# Patient Record
Sex: MALE | Race: WHITE | Employment: OTHER | URBAN - METROPOLITAN AREA
[De-identification: names, ages, dates, MRNs, and addresses within clinical notes are randomized per-mention and may not be internally consistent; named-entity substitution may affect disease eponyms.]

---

## 2023-01-01 ENCOUNTER — HOSPITAL ENCOUNTER (INPATIENT)
Age: 88
LOS: 1 days | Discharge: HOME | DRG: 057 | End: 2023-01-02
Attending: STUDENT IN AN ORGANIZED HEALTH CARE EDUCATION/TRAINING PROGRAM | Admitting: FAMILY MEDICINE
Payer: MEDICARE

## 2023-01-01 ENCOUNTER — APPOINTMENT (OUTPATIENT)
Dept: MRI IMAGING | Age: 88
DRG: 057 | End: 2023-01-01
Attending: STUDENT IN AN ORGANIZED HEALTH CARE EDUCATION/TRAINING PROGRAM
Payer: MEDICARE

## 2023-01-01 ENCOUNTER — APPOINTMENT (OUTPATIENT)
Dept: CT IMAGING | Age: 88
DRG: 057 | End: 2023-01-01
Attending: STUDENT IN AN ORGANIZED HEALTH CARE EDUCATION/TRAINING PROGRAM
Payer: MEDICARE

## 2023-01-01 DIAGNOSIS — I10 HYPERTENSION, UNSPECIFIED TYPE: ICD-10-CM

## 2023-01-01 DIAGNOSIS — G30.9 MILD ALZHEIMER'S DEMENTIA WITH ANXIETY, UNSPECIFIED TIMING OF DEMENTIA ONSET (HCC): ICD-10-CM

## 2023-01-01 DIAGNOSIS — F02.A4 MILD ALZHEIMER'S DEMENTIA WITH ANXIETY, UNSPECIFIED TIMING OF DEMENTIA ONSET (HCC): ICD-10-CM

## 2023-01-01 DIAGNOSIS — R47.01 APHASIA: Primary | ICD-10-CM

## 2023-01-01 PROBLEM — G45.9 TIA (TRANSIENT ISCHEMIC ATTACK): Status: ACTIVE | Noted: 2023-01-01

## 2023-01-01 LAB
ALBUMIN SERPL-MCNC: 3.8 G/DL (ref 3.5–5)
ALBUMIN/GLOB SERPL: 1.4 {RATIO} (ref 1.1–2.2)
ALP SERPL-CCNC: 43 U/L (ref 45–117)
ALT SERPL-CCNC: 22 U/L (ref 12–78)
ANION GAP SERPL CALC-SCNC: 6 MMOL/L (ref 5–15)
AST SERPL-CCNC: 18 U/L (ref 15–37)
BASOPHILS # BLD: 0.1 K/UL (ref 0–0.1)
BASOPHILS NFR BLD: 2 % (ref 0–1)
BILIRUB SERPL-MCNC: 0.3 MG/DL (ref 0.2–1)
BUN SERPL-MCNC: 30 MG/DL (ref 6–20)
BUN/CREAT SERPL: 24 (ref 12–20)
CALCIUM SERPL-MCNC: 8.8 MG/DL (ref 8.5–10.1)
CHLORIDE SERPL-SCNC: 101 MMOL/L (ref 97–108)
CO2 SERPL-SCNC: 29 MMOL/L (ref 21–32)
COMMENT, HOLDF: NORMAL
CREAT SERPL-MCNC: 1.24 MG/DL (ref 0.7–1.3)
DIFFERENTIAL METHOD BLD: ABNORMAL
EOSINOPHIL # BLD: 0.5 K/UL (ref 0–0.4)
EOSINOPHIL NFR BLD: 8 % (ref 0–7)
ERYTHROCYTE [DISTWIDTH] IN BLOOD BY AUTOMATED COUNT: 11.8 % (ref 11.5–14.5)
GLOBULIN SER CALC-MCNC: 2.7 G/DL (ref 2–4)
GLUCOSE BLD STRIP.AUTO-MCNC: 107 MG/DL (ref 65–117)
GLUCOSE SERPL-MCNC: 86 MG/DL (ref 65–100)
HCT VFR BLD AUTO: 33.2 % (ref 36.6–50.3)
HGB BLD-MCNC: 10.8 G/DL (ref 12.1–17)
IMM GRANULOCYTES # BLD AUTO: 0 K/UL (ref 0–0.04)
IMM GRANULOCYTES NFR BLD AUTO: 0 % (ref 0–0.5)
INR PPP: 1 (ref 0.9–1.1)
LYMPHOCYTES # BLD: 1.1 K/UL (ref 0.8–3.5)
LYMPHOCYTES NFR BLD: 16 % (ref 12–49)
MCH RBC QN AUTO: 32.4 PG (ref 26–34)
MCHC RBC AUTO-ENTMCNC: 32.5 G/DL (ref 30–36.5)
MCV RBC AUTO: 99.7 FL (ref 80–99)
MONOCYTES # BLD: 1 K/UL (ref 0–1)
MONOCYTES NFR BLD: 14 % (ref 5–13)
NEUTS SEG # BLD: 4.3 K/UL (ref 1.8–8)
NEUTS SEG NFR BLD: 60 % (ref 32–75)
NRBC # BLD: 0 K/UL (ref 0–0.01)
NRBC BLD-RTO: 0 PER 100 WBC
PLATELET # BLD AUTO: 205 K/UL (ref 150–400)
PMV BLD AUTO: 8.8 FL (ref 8.9–12.9)
POTASSIUM SERPL-SCNC: 4.6 MMOL/L (ref 3.5–5.1)
PROT SERPL-MCNC: 6.5 G/DL (ref 6.4–8.2)
PROTHROMBIN TIME: 10.6 SEC (ref 9–11.1)
RBC # BLD AUTO: 3.33 M/UL (ref 4.1–5.7)
SAMPLES BEING HELD,HOLD: NORMAL
SERVICE CMNT-IMP: NORMAL
SODIUM SERPL-SCNC: 136 MMOL/L (ref 136–145)
WBC # BLD AUTO: 7 K/UL (ref 4.1–11.1)

## 2023-01-01 PROCEDURE — 80053 COMPREHEN METABOLIC PANEL: CPT

## 2023-01-01 PROCEDURE — 0042T CT CODE NEURO PERF W CBF: CPT

## 2023-01-01 PROCEDURE — 70496 CT ANGIOGRAPHY HEAD: CPT

## 2023-01-01 PROCEDURE — 65270000046 HC RM TELEMETRY

## 2023-01-01 PROCEDURE — 36415 COLL VENOUS BLD VENIPUNCTURE: CPT

## 2023-01-01 PROCEDURE — 85610 PROTHROMBIN TIME: CPT

## 2023-01-01 PROCEDURE — 85025 COMPLETE CBC W/AUTO DIFF WBC: CPT

## 2023-01-01 PROCEDURE — 70450 CT HEAD/BRAIN W/O DYE: CPT

## 2023-01-01 PROCEDURE — 99285 EMERGENCY DEPT VISIT HI MDM: CPT

## 2023-01-01 PROCEDURE — 74011000636 HC RX REV CODE- 636: Performed by: RADIOLOGY

## 2023-01-01 PROCEDURE — 82962 GLUCOSE BLOOD TEST: CPT

## 2023-01-01 RX ORDER — GUAIFENESIN 100 MG/5ML
324 LIQUID (ML) ORAL ONCE
Status: ACTIVE | OUTPATIENT
Start: 2023-01-01 | End: 2023-01-02

## 2023-01-01 RX ORDER — ACETAMINOPHEN 650 MG/1
650 SUPPOSITORY RECTAL
Status: DISCONTINUED | OUTPATIENT
Start: 2023-01-01 | End: 2023-01-02 | Stop reason: HOSPADM

## 2023-01-01 RX ORDER — ATORVASTATIN CALCIUM 40 MG/1
40 TABLET, FILM COATED ORAL
Status: DISCONTINUED | OUTPATIENT
Start: 2023-01-01 | End: 2023-01-02 | Stop reason: HOSPADM

## 2023-01-01 RX ORDER — GUAIFENESIN 100 MG/5ML
81 LIQUID (ML) ORAL DAILY
Status: DISCONTINUED | OUTPATIENT
Start: 2023-01-02 | End: 2023-01-01

## 2023-01-01 RX ORDER — ACETAMINOPHEN 325 MG/1
650 TABLET ORAL
Status: DISCONTINUED | OUTPATIENT
Start: 2023-01-01 | End: 2023-01-02 | Stop reason: HOSPADM

## 2023-01-01 RX ADMIN — IOPAMIDOL 80 ML: 755 INJECTION, SOLUTION INTRAVENOUS at 19:58

## 2023-01-01 RX ADMIN — IOPAMIDOL 40 ML: 755 INJECTION, SOLUTION INTRAVENOUS at 19:58

## 2023-01-02 ENCOUNTER — APPOINTMENT (OUTPATIENT)
Dept: MRI IMAGING | Age: 88
DRG: 057 | End: 2023-01-02
Attending: STUDENT IN AN ORGANIZED HEALTH CARE EDUCATION/TRAINING PROGRAM
Payer: MEDICARE

## 2023-01-02 VITALS
SYSTOLIC BLOOD PRESSURE: 97 MMHG | HEART RATE: 76 BPM | RESPIRATION RATE: 20 BRPM | WEIGHT: 203.48 LBS | TEMPERATURE: 98 F | OXYGEN SATURATION: 95 % | DIASTOLIC BLOOD PRESSURE: 61 MMHG

## 2023-01-02 LAB
CHOLEST SERPL-MCNC: 154 MG/DL
EST. AVERAGE GLUCOSE BLD GHB EST-MCNC: 111 MG/DL
HBA1C MFR BLD: 5.5 % (ref 4–5.6)
HDLC SERPL-MCNC: 71 MG/DL
HDLC SERPL: 2.2 {RATIO} (ref 0–5)
LDLC SERPL CALC-MCNC: 66 MG/DL (ref 0–100)
TRIGL SERPL-MCNC: 85 MG/DL (ref ?–150)
VLDLC SERPL CALC-MCNC: 17 MG/DL

## 2023-01-02 PROCEDURE — 97165 OT EVAL LOW COMPLEX 30 MIN: CPT

## 2023-01-02 PROCEDURE — 36415 COLL VENOUS BLD VENIPUNCTURE: CPT

## 2023-01-02 PROCEDURE — 83036 HEMOGLOBIN GLYCOSYLATED A1C: CPT

## 2023-01-02 PROCEDURE — 97535 SELF CARE MNGMENT TRAINING: CPT

## 2023-01-02 PROCEDURE — 97161 PT EVAL LOW COMPLEX 20 MIN: CPT

## 2023-01-02 PROCEDURE — 99223 1ST HOSP IP/OBS HIGH 75: CPT | Performed by: NURSE PRACTITIONER

## 2023-01-02 PROCEDURE — 70551 MRI BRAIN STEM W/O DYE: CPT

## 2023-01-02 PROCEDURE — 80061 LIPID PANEL: CPT

## 2023-01-02 RX ORDER — ATORVASTATIN CALCIUM 40 MG/1
40 TABLET, FILM COATED ORAL
Qty: 30 TABLET | Refills: 0 | Status: SHIPPED | OUTPATIENT
Start: 2023-01-02 | End: 2023-02-01

## 2023-01-02 RX ORDER — GUAIFENESIN 100 MG/5ML
81 LIQUID (ML) ORAL DAILY
Status: DISCONTINUED | OUTPATIENT
Start: 2023-01-02 | End: 2023-01-02 | Stop reason: HOSPADM

## 2023-01-02 RX ORDER — GUAIFENESIN 100 MG/5ML
81 LIQUID (ML) ORAL DAILY
Qty: 30 TABLET | Refills: 0 | Status: SHIPPED | OUTPATIENT
Start: 2023-01-02 | End: 2023-02-01

## 2023-01-02 NOTE — PROGRESS NOTES
[facility]  Hospitalist Group                                                                                          Hospitalist Progress Note  Ed Grayson  Answering service: [phone] from in house phone        Date of Service:  2023  NAME:  Holger Montano  :  1935  MRN:  [id number]      Admission Summary:   Holger Montano is a 82 y.o. male with a pmhx of alzheimers dementia, and HTN who presents with aphasia that started on 6pm.  He was eating dinner at the time. His sx rapidly resolved. Patient's son who gives the majority of the history due to patient's dementia, patient has recurrently stated that he can not speak or walk. Per paitent's son, this has happened again while patient was at his home. Patient was speaking, and had just ambulated to the bathroom without assistance    In the ED, he was tachycardic to 112, and had RR of 35. Initial BP was 181/90. Labs showed BUN 30, creatinine 1.24, and macrocytic anemia with hgb 10.8. CT head with no acute intracranial process. CTA head/neck with no acute large vessel occlusion or perfusion abnormality. There was small nonocclusive plaque/atheroembolus in M2/M3 of left MCA, and mild aneurysmal dilatations of ascending aorta. Interval history / Subjective:   No acute interval events. Saw the patient on rounds this morning, pt's son was at the bedside. Neuro exam was grossly benign. Son is hoping to have the pt return to [facility] today. Informed him that we would await recommendations from the neurology team before making a discharge decision. CM made aware in case we can discharge today     Assessment & Plan:     ?aphasia  Weakness  C/F CVA  - Neurology consulted, will follow up with their recommendations  - CT code stroke: No acute large vessel occlusion, no acute intracranial process  - MRI brain: Diffuse periventricular white matter disease is compatible with chronic small  vessel ischemia.  No evidence of acute infarct, intracranial mass, or acute  intracranial hemorrhage  - Echo pending  - Lipid panel: WNL, will continue statin pending neuro recs   - Continue ASA and statin  - PT/OT to evaluate    Alzhemiers dementia  - Lives at [facility]  - Will follow up with case management for disposition, hopefully can DC today (1/2)    Code status: Full  Prophylaxis: SCDs  Care Plan discussed with: Patient, family member, CM  Anticipated Disposition: Back to [facility], 24-48h pending neuro, PT/OT [facility]     Hospital Problems  Never Reviewed            Codes Class Noted POA    TIA (transient ischemic attack) ICD-10-CM: G45.9  ICD-9-CM: 435.9  1/1/2023 Unknown             Review of Systems:   A comprehensive review of systems was negative except for that written in the HPI. Vital Signs:    Last 24hrs VS reviewed since prior progress note. Most recent are:  Visit Vitals  /85   Pulse 77   Temp 97.5 °F (36.4 °C)   Resp 12   Wt 92.3 kg (203 lb 7.8 oz)   SpO2 95%       No intake or output data in the 24 hours ending 01/02/23 3992     Physical Examination:     I had a face to face encounter with this patient and independently examined them on 1/2/2023 as outlined below:          Constitutional:  No acute distress, cooperative, pleasant    ENT:  Oral mucosa moist, oropharynx benign. Resp:  CTA bilaterally. No wheezing/rhonchi/rales. No accessory muscle use. CV:  Regular rhythm, normal rate, no murmurs, gallops, rubs    GI:  Soft, non distended, non tender. normoactive bowel sounds    Musculoskeletal:  No edema, warm, 2+ pulses throughout    Neurologic:  Moves all extremities.   AAOx3, CN II-XII intact, strength 5/5 in BLE and BUE            Data Review:    Review and/or order of clinical lab test  Review and/or order of tests in the radiology section of CPT  Review and/or order of tests in the medicine section of CPT      Labs:     Recent Labs     01/01/23 2004   WBC 7.0   HGB 10.8*   HCT 33.2*    Recent Labs     01/01/23 2004      K 4.6      CO2 29   BUN 30*   CREA 1.24   GLU 86   CA 8.8     Recent Labs     01/01/23 2004   ALT 22   AP 43*   TBILI 0.3   TP 6.5   ALB 3.8   GLOB 2.7     Recent Labs     01/01/23 2004   INR 1.0   PTP 10.6      No results for input(s): FE, TIBC, PSAT, FERR in the last 72 hours. No results found for: FOL, RBCF   No results for input(s): PH, PCO2, PO2 in the last 72 hours. No results for input(s): CPK, CKNDX, TROIQ in the last 72 hours.     No lab exists for component: CPKMB  Lab Results   Component Value Date/Time    Cholesterol, total 154 01/02/2023 06:42 AM    HDL Cholesterol 71 01/02/2023 06:42 AM    LDL, calculated 66 01/02/2023 06:42 AM    Triglyceride 85 01/02/2023 06:42 AM    CHOL/HDL Ratio 2.2 01/02/2023 06:42 AM     Lab Results   Component Value Date/Time    Glucose (POC) 107 01/01/2023 07:45 PM     No results found for: COLOR, APPRN, SPGRU, REFSG, ILIANA, PROTU, GLUCU, KETU, BILU, UROU, LETICIA, LEUKU, GLUKE, EPSU, BACTU, WBCU, RBCU, CASTS, UCRY      Medications Reviewed:     Current Facility-Administered Medications   Medication Dose Route Frequency    acetaminophen (TYLENOL) tablet 650 mg  650 mg Oral Q4H PRN    Or    acetaminophen (TYLENOL) solution 650 mg  650 mg Per NG tube Q4H PRN    Or    acetaminophen (TYLENOL) suppository 650 mg  650 mg Rectal Q4H PRN    atorvastatin (LIPITOR) tablet 40 mg  40 mg Oral QHS    aspirin chewable tablet 324 mg  324 mg Oral ONCE     ______________________________________________________________________  EXPECTED LENGTH OF STAY: - - -  ACTUAL LENGTH OF STAY:          1                 Larri Fret Milligram, PA-C

## 2023-01-02 NOTE — PROGRESS NOTES
OCCUPATIONAL THERAPY EVALUATION/DISCHARGE  Patient: Matthew Valdez (12 y.o. male)  Date: 1/2/2023  Primary Diagnosis: TIA (transient ischemic attack) [G45.9]       Precautions:        ASSESSMENT  Based on the objective data described below, the patient presents with return to baseline function s/p admission for self-reported AMS, aphasia, and ataxia, neuro workup negative for acute process. At baseline, patient resides at an intermediate, Mod I-SPV for ADLs and mobility with SPC use around the facility and community, supportive son in the area, hx of Alzheimer's dementia. He now presents back to his baseline, completing ADLs and bathroom mobility with Mod I without AD, hx of difficulty with R knee however functional. Son at bedside reports no change in function, patient also reporting he feels back to baseline. No further acute OT needs, safe to d/c back to intermediate once medically cleared. Current Level of Function (ADLs/self-care): IND-Mod I for ADLs and mobility with no AD    Functional Outcome Measure: The patient scored 100/100 on the Barthel Index outcome measure which is indicative of independence in basic self care and mobility. Other factors to consider for discharge: none     PLAN :  Recommend with staff: Recommend with nursing, ADLs with supervision/setup, OOB to chair 3x/day, and toileting via functional mobility to and from bathroom. Thank you for completing as able in order to maintain patient strength, endurance and independence. Recommendation for discharge: (in order for the patient to meet his/her long term goals)  No skilled occupational therapy/ follow up rehabilitation needs identified at this time.     This discharge recommendation:  Has been made in collaboration with the attending provider and/or case management    IF patient discharges home will need the following DME: none       SUBJECTIVE:   Patient stated It's a 1. re: with attempts to read numbers on the clock, stating 1 instead of 11, 12 instead of 2    OBJECTIVE DATA SUMMARY:   HISTORY:   No past medical history on file. No past surgical history on file. Prior Level of Function/Environment/Context:   Expanded or extensive additional review of patient history:   Home Situation  [facility] Environment: [facility] [address] Name: [place]  One/Two Story Residence: Two story (can take elevator)  Living Alone: No  Support Systems: Assisted Living, Child(lópez)  Patient Expects to be Discharged to[de-identified] Assisted Living  Current DME Used/Available at Home: Cane, straight  Tub or Shower Type: Shower    Hand dominance: Right    EXAMINATION OF PERFORMANCE DEFICITS:  Cognitive/Behavioral Status:  Neurologic State: Alert;Confused  Orientation Level: Oriented to person;Oriented to place; Disoriented to situation;Disoriented to time  Cognition: Follows commands;Memory loss  Perception: Appears intact  Perseveration: No perseveration noted  Safety/Judgement: Awareness of environment;Decreased insight into deficits; Fall prevention    Skin: Appears intact    Edema: None    Hearing:       Vision/Perceptual:    Tracking: Able to track stimulus in all quadrants w/o difficulty                      Acuity:  (unable to read clock on the wall correctly, ID'd short/long arms bu tunable to state the name of the numbers arms were pointing to)    Corrective Lenses: Glasses    Range of Motion:  AROM: Within functional limits                         Strength:  Strength: Within functional limits                Coordination:  Coordination: Within functional limits  Fine Motor Skills-Upper: Left Intact; Right Intact    Gross Motor Skills-Upper: Left Intact; Right Intact    Tone & Sensation:  Tone: Normal  Sensation: Intact                      Balance:  Sitting: Intact  Standing: Impaired; Without support  Standing - Static: Good  Standing - Dynamic : Good    Functional Mobility and Transfers for ADLs:  Bed Mobility:  Rolling: Independent  Supine to Sit: Independent  Sit to Supine: Independent  Scooting: Independent    Transfers:  Sit to Stand: Modified independent  Stand to Sit: Modified independent  Bathroom Mobility: Modified independent  Toilet Transfer : Modified independent  Assistive Device : Gait Belt    ADL Assessment:  Feeding: Independent    Oral Facial Hygiene/Grooming: Modified Independent    Bathing: Modified independent      Upper Body Dressing: Independent    Lower Body Dressing: Modified independent    Toileting: Modified independent                ADL Intervention and task modifications:     Lower Body Dressing Assistance  Dressing Assistance: Modified independent  Pants With Elastic Waist: Modified independent (simulated)  Shoes with Cloth Laces: Modified independent  Leg Crossed Method Used: No  Position Performed: Bending forward method;Seated in chair;Standing    Toileting  Toileting Assistance: Modified independent (simulated in bathroom)  Bladder Hygiene: Independent  Bowel Hygiene: Independent  Clothing Management: Modified independent    Cognitive Retraining  Safety/Judgement: Awareness of environment;Decreased insight into deficits; Fall prevention    Functional Measure:    Barthel Index:  Bathin  Bladder: 10  Bowels: 10  Groomin  Dressing: 10  Feeding: 10  Mobility: 15  Stairs: 10  Toilet Use: 10  Transfer (Bed to Chair and Back): 15  Total: 100/100      The Barthel ADL Index: Guidelines  1. The index should be used as a record of what a patient does, not as a record of what a patient could do. 2. The main aim is to establish degree of independence from any help, physical or verbal, however minor and for whatever reason. 3. The need for supervision renders the patient not independent. 4. A patient's performance should be established using the best available evidence. Asking the patient, friends/relatives and nurses are the usual sources, but direct observation and common sense are also important. However direct testing is not needed.   5. Usually the patient's performance over the preceding 24-48 hours is important, but occasionally longer periods will be relevant. 6. Middle categories imply that the patient supplies over 50 per cent of the effort. 7. Use of aids to be independent is allowed. Score Interpretation (from [facility] 83)    Independent   60-79 Minimally independent   40-59 Partially dependent   20-39 Very dependent   <20 Totally dependent     -Odette Crump., Barthel, D.W. (1965). Functional evaluation: the Barthel Index. [address] ([address] [place] [address] (1997). The Barthel activities of daily living index: self-reporting versus actual performance in the old (> or = 75 years). Journal of [address] [place] [address] 45(7), 14 [facility], RADHA, Jagdish Loza., Ziad Lucero. (1999). Measuring the change in disability after inpatient rehabilitation; comparison of the responsiveness of the Barthel Index and Functional Bibb Measure. Journal of Neurology, Neurosurgery, and Psychiatry, 66(4), 699-884. Eddye Ho NRoberJ.DANIE, KUMAR Nguyen, & Zack Pennington, MERICH. (2004) Assessment of post-stroke quality of life in cost-effectiveness studies: The usefulness of the Barthel Index and the EuroQoL-5D. Quality of Life Research, 15, 187-86         Occupational Therapy Evaluation Charge Determination   History Examination Decision-Making   LOW Complexity : Brief history review  LOW Complexity : 1-3 performance deficits relating to physical, cognitive , or psychosocial skils that result in activity limitations and / or participation restrictions  LOW Complexity : No comorbidities that affect functional and no verbal or physical assistance needed to complete eval tasks       Based on the above components, the patient evaluation is determined to be of the following complexity level: LOW   Pain Rating:  None reported    Activity Tolerance:    WNL    After treatment patient left in no apparent distress: Sitting in chair, Call bell within reach, and Caregiver / family present    COMMUNICATION/EDUCATION:   The patients plan of care was discussed with: Physical therapist and Registered nurse.      Thank you for this referral.  BHUPINDER Swift, OTR/L  Time Calculation: 14 mins

## 2023-01-02 NOTE — PROGRESS NOTES
Problem: Falls - Risk of  Goal: *Absence of Falls  Description: Document Louie Barnes Fall Risk and appropriate interventions in the flowsheet.   Outcome: Progressing Towards Goal  Note: Fall Risk Interventions:  Mobility Interventions: Assess mobility with egress test, Bed/chair exit alarm, Communicate number of staff needed for ambulation/transfer, OT consult for ADLs, Patient to call before getting OOB, PT Consult for mobility concerns, PT Consult for assist device competence, Utilize walker, cane, or other assistive device    Mentation Interventions: Adequate sleep, hydration, pain control, Bed/chair exit alarm, Door open when patient unattended, Evaluate medications/consider consulting pharmacy, Increase mobility, More frequent rounding, Reorient patient, Room close to nurse's station, Toileting rounds, Update white board    Medication Interventions: Assess postural VS orthostatic hypotension, Bed/chair exit alarm, Evaluate medications/consider consulting pharmacy, Patient to call before getting OOB, Teach patient to arise slowly                   Problem: Patient Education: Go to Patient Education Activity  Goal: Patient/Family Education  Outcome: Progressing Towards Goal     Problem: Patient Education: Go to Patient Education Activity  Goal: Patient/Family Education  Outcome: Progressing Towards Goal     Problem: TIA/CVA Stroke: 0-24 hours  Goal: Off Pathway (Use only if patient is Off Pathway)  Outcome: Progressing Towards Goal  Goal: Activity/Safety  Outcome: Progressing Towards Goal  Goal: Consults, if ordered  Outcome: Progressing Towards Goal  Goal: Diagnostic Test/Procedures  Outcome: Progressing Towards Goal  Goal: Nutrition/Diet  Outcome: Progressing Towards Goal  Goal: Discharge Planning  Outcome: Progressing Towards Goal  Goal: Medications  Outcome: Progressing Towards Goal  Goal: Respiratory  Outcome: Progressing Towards Goal  Goal: Treatments/Interventions/Procedures  Outcome: Progressing Towards Goal  Goal: Minimize risk of bleeding post-thrombolytic infusion  Outcome: Progressing Towards Goal  Goal: Monitor for complications post-thrombolytic infusion  Outcome: Progressing Towards Goal  Goal: Psychosocial  Outcome: Progressing Towards Goal  Goal: *Hemodynamically stable  Outcome: Progressing Towards Goal  Goal: *Neurologically stable  Description: Absence of additional neurological deficits    Outcome: Progressing Towards Goal  Goal: *Verbalizes anxiety and depression are reduced or absent  Outcome: Progressing Towards Goal  Goal: *Absence of Signs of Aspiration on Current Diet  Outcome: Progressing Towards Goal  Goal: *Absence of deep venous thrombosis signs and symptoms(Stroke Metric)  Outcome: Progressing Towards Goal  Goal: *Ability to perform ADLs and demonstrates progressive mobility and function  Outcome: Progressing Towards Goal  Goal: *Stroke education started(Stroke Metric)  Outcome: Progressing Towards Goal  Goal: *Dysphagia screen performed(Stroke Metric)  Outcome: Progressing Towards Goal  Goal: *Rehab consulted(Stroke Metric)  Outcome: Progressing Towards Goal     Problem: TIA/CVA Stroke: Day 2 Until Discharge  Goal: Off Pathway (Use only if patient is Off Pathway)  Outcome: Progressing Towards Goal  Goal: Activity/Safety  Outcome: Progressing Towards Goal  Goal: Diagnostic Test/Procedures  Outcome: Progressing Towards Goal  Goal: Nutrition/Diet  Outcome: Progressing Towards Goal  Goal: Discharge Planning  Outcome: Progressing Towards Goal  Goal: Medications  Outcome: Progressing Towards Goal  Goal: Respiratory  Outcome: Progressing Towards Goal  Goal: Treatments/Interventions/Procedures  Outcome: Progressing Towards Goal  Goal: Psychosocial  Outcome: Progressing Towards Goal  Goal: *Verbalizes anxiety and depression are reduced or absent  Outcome: Progressing Towards Goal  Goal: *Absence of aspiration  Outcome: Progressing Towards Goal  Goal: *Absence of deep venous thrombosis signs and symptoms(Stroke Metric)  Outcome: Progressing Towards Goal  Goal: *Optimal pain control at patient's stated goal  Outcome: Progressing Towards Goal  Goal: *Tolerating diet  Outcome: Progressing Towards Goal  Goal: *Ability to perform ADLs and demonstrates progressive mobility and function  Outcome: Progressing Towards Goal  Goal: *Stroke education continued(Stroke Metric)  Outcome: Progressing Towards Goal     Problem: Ischemic Stroke: Discharge Outcomes  Goal: *Verbalizes anxiety and depression are reduced or absent  Outcome: Progressing Towards Goal  Goal: *Verbalize understanding of risk factor modification(Stroke Metric)  Outcome: Progressing Towards Goal  Goal: *Hemodynamically stable  Outcome: Progressing Towards Goal  Goal: *Absence of aspiration pneumonia  Outcome: Progressing Towards Goal  Goal: *Aware of needed dietary changes  Outcome: Progressing Towards Goal  Goal: *Verbalize understanding of prescribed medications including anti-coagulants, anti-lipid, and/or anti-platelets(Stroke Metric)  Outcome: Progressing Towards Goal  Goal: *Tolerating diet  Outcome: Progressing Towards Goal  Goal: *Aware of follow-up diagnostics related to anticoagulants  Outcome: Progressing Towards Goal  Goal: *Ability to perform ADLs and demonstrates progressive mobility and function  Outcome: Progressing Towards Goal  Goal: *Absence of DVT(Stroke Metric)  Outcome: Progressing Towards Goal  Goal: *Absence of aspiration  Outcome: Progressing Towards Goal  Goal: *Optimal pain control at patient's stated goal  Outcome: Progressing Towards Goal  Goal: *Home safety concerns addressed  Outcome: Progressing Towards Goal  Goal: *Describes available resources and support systems  Outcome: Progressing Towards Goal  Goal: *Verbalizes understanding of activation of EMS(911) for stroke symptoms(Stroke Metric)  Outcome: Progressing Towards Goal  Goal: *Understands and describes signs and symptoms to report to providers(Stroke Metric)  Outcome: Progressing Towards Goal  Goal: *Neurolgocially stable (absence of additional neurological deficits)  Outcome: Progressing Towards Goal  Goal: *Verbalizes importance of follow-up with primary care physician(Stroke Metric)  Outcome: Progressing Towards Goal  Goal: *Smoking cessation discussed,if applicable(Stroke Metric)  Outcome: Progressing Towards Goal  Goal: *Depression screening completed(Stroke Metric)  Outcome: Progressing Towards Goal     Problem: Pressure Injury - Risk of  Goal: *Prevention of pressure injury  Description: Document Imtiaz Scale and appropriate interventions in the flowsheet.   Outcome: Progressing Towards Goal     Problem: Patient Education: Go to Patient Education Activity  Goal: Patient/Family Education  Outcome: Progressing Towards Goal     Problem: Discharge Planning  Goal: *Discharge to safe environment  Outcome: Progressing Towards Goal  Goal: *Knowledge of medication management  Outcome: Progressing Towards Goal  Goal: *Knowledge of discharge instructions  Outcome: Progressing Towards Goal     Problem: Patient Education: Go to Patient Education Activity  Goal: Patient/Family Education  Outcome: Progressing Towards Goal

## 2023-01-02 NOTE — CONSULTS
NEUROLOGY CONSULT NOTE    Name Suraj Santoyo Age 82 y.o. MRN [id number]  1935     Consulting Physician: Daniel Mejía PA-C      Chief Complaint: aphasia     Assessment:     Active Problems:    TIA (transient ischemic attack) (2023)      82year-old male with history of Alzheimer's dementia and HTN who verbalized that he could not talk or move yesterday but was witnessed by son as being able to do both normally. Patient has stated this recently to his son while at his assisted living facility over the last 2 weeks with no noted changes from the son. CT head showed no acute intracranial process. CTA/P showed L M2/M3 segment calcified emboli. MRI brain showed no acute infarct. LDL 66  Likely more related to anxiety in setting of Alzheimer's dementia and not TIA  Recommendations:     - Continue aspirin 81 mg daily. - Continue atorvastatin 40 mg daily  - Discontinue TTE  - Goal SBP <140/90 on discharge. Continue home antihypertensives. - Dispo to assisted living facility    Neurology has no further recommendations. I instructed him to call Neurology clinic here or at another facility including [facility] to establish memory disorder care. History of Present Illness: This is a 82 y. o.right-handed male with history of Alzheimer's dementia and HTN who was eating with his son yesterday around 6p and stated that he suddenly could not talk or move. The patient was able to walk to the bathroom and then come back to the table. The son said he was talking fine the entire time with no slurred speech, no stuttering speech and no aphasia. The patient then said \"something is wrong and you have to take me to the hospital.\" The son states that his father has had similar episodes ~7-10 times over the last 15 days. He will call his son from his assisted living facility and state with normal speech \"I cannot talk please come here. \" The son states that when the episode occurred yesterday and when his dad was anxious and demanding he go to the hospital that he of course wanted to get him worked up further. He was diagnosed with Alzheimers 2-3 years ago and moved to [place] from [facility] 2 months ago to be closer to his son. He lives in an assisted living facility and spends weekend evenings at his sons house. His son states that his father will get anxious when he is not able to think through some tasks such as writing and spelling and the patient claims he will get anxious thinking he won't be able to do something. The patient denies presyncope, visual changes, headaches or weakness. We are asked to consult on the patient for possible TIA. No Known Allergies     Prior to Admission medications    Not on File       No past medical history on file. No past surgical history on file. Social History     Tobacco Use    Smoking status: Not on file    Smokeless tobacco: Not on file   Substance Use Topics    Alcohol use: Not on file        No family history on file. Review of Systems:   Comprehensive review of systems performed and negative except for as listed above. Exam:     Visit Vitals  /72 (BP 1 Location: Right upper arm, BP Patient Position: At rest)   Pulse 76   Temp 98.2 °F (36.8 °C)   Resp 18   Wt 92.3 kg (203 lb 7.8 oz)   SpO2 95%        General: Well developed, well nourished. Patient in no apparent distress   Head: Normocephalic, atraumatic, anicteric sclera   Lungs:  Clear to auscultation bilaterally, No wheezes or rubs   Cardiac: RRR with no murmurs. Abd: Bowel sounds were audible. No tenderness on palpation   Ext: No pedal edema   Skin: No overt signs of rash     Neurological Exam:  Mental Status: A&O x1-2. Knows name, recognizes hospital with options, does not know year   Speech: Fluent no aphasia or dysarthria. Cranial Nerves:   VFF. Facial sensation is normal. Facial movement is symmetric. Palate is midline. Normal sternocleidomastoid strength. Tongue is midline. Hearing is intact bilaterally. Eyes: PERRL, EOM's full, no nystagmus, no ptosis. Motor:  FC x4 5/5. Reflexes:   DTR 2+/4 and symmetrical.  Toes downgoing bilat. Sensory:   SILT bilat throughout   Gait:  Not assessed. Tremor:   No tremor noted. Cerebellar:  FTN intact. Imaging  CT Results (maximum last 3): Results from Hospital Encounter encounter on 01/01/23    CT CODE NEURO PERF W CBF    Narrative  CLINICAL HISTORY: Code stroke    EXAMINATION:  CT ANGIOGRAPHY HEAD AND NECK    COMPARISON: None    TECHNIQUE:  Following the uneventful administration of iodinated contrast  material, axial CT angiography of the head and neck was performed. Delayed axial  images through the head were also obtained. Coronal and sagittal reconstructions  were obtained. Manual postprocessing of images was performed. 3-D  Sagittal  maximal intensity projection images were obtained. 3-D Coronal maximal  intensity projections were obtained. CT dose reduction was achieved through use  of a standardized protocol tailored for this examination and automatic exposure  control for dose modulation. CT perfusion analysis was performed using CT with  contrast administration, including postprocessing of parametric maps with the  determination of cerebral blood flow, cerebral blood volume, and mean transit  time. CT dose reduction was achieved through use of a standardized protocol tailored  for this examination and automatic exposure control for dose modulation. This study was analyzed by the [address] ai algorithm    FINDINGS:    Delayed contrast-enhanced head CT:    Mild prominence of ventricles. There is no acute intra or extra-axial  hemorrhage. Mild to moderate bilateral subcortical and periventricular areas of  patchy low attenuation is nonspecific but likely related to changes of chronic  small vessel ischemic disease. The basal cisterns are clear. The paranasal  sinuses are clear.     CTA NECK:    Great vessels: Mild aneurysmal dilatation of the ascending aorta measuring 4.2  cm. Patent origins with bovine arch anatomy. Right subclavian artery: Mild atherosclerosis    Left subclavian artery: Mild atherosclerosis    Right common carotid artery: Mild atherosclerosis    Left common carotid artery: Mild atherosclerosis    Cervical right internal carotid artery: Patent with mild atherosclerotic changes  causing no significant stenosis by NASCET criteria. Cervical left internal carotid artery: Patent with mild atherosclerotic changes  causing no significant stenosis by NASCET criteria. Right vertebral artery: Mild ostial stenosis    Left vertebral artery: Patent    CTA HEAD:    Right cavernous internal carotid artery: Mild to moderate atherosclerosis    Left cavernous internal carotid artery: Mild to moderate atherosclerosis    Anterior cerebral arteries: Mild atherosclerosis    Anterior communicating artery: Patent    Right middle cerebral artery: Mild atherosclerosis    Left middle cerebral artery: Mild to moderate atherosclerosis with nonocclusive  calcific plaque versus atheroemboli in the M2/M3 segments (series 2, images 452,  481)    Posterior communicating arteries: Patent    Posterior cerebral arteries: Mild bilateral atherosclerosis    Basilar artery: Patent    Distal vertebral arteries: Patent    CT perfusion brain: No significant cerebral perfusion abnormality    Measurements use NASCET criteria. Prominent but nonenlarged right paratracheal mediastinal lymph node. Moderate cervical spondylosis    Impression  1. No evidence for acute, central large vessel arterial occlusion or significant  cerebral perfusion abnormality. 2. Small nonocclusive calcific plaque versus atheroemboli in the M2/M3 segments  of the left middle cerebral artery. 3. Mild to moderate atherosclerotic changes as described above. 4. Mild aneurysmal dilatation of the ascending aorta.   5. Please refer to above findings for complete details. CTA CODE NEURO HEAD AND NECK W CONT    Narrative  CLINICAL HISTORY: Code stroke    EXAMINATION:  CT ANGIOGRAPHY HEAD AND NECK    COMPARISON: None    TECHNIQUE:  Following the uneventful administration of iodinated contrast  material, axial CT angiography of the head and neck was performed. Delayed axial  images through the head were also obtained. Coronal and sagittal reconstructions  were obtained. Manual postprocessing of images was performed. 3-D  Sagittal  maximal intensity projection images were obtained. 3-D Coronal maximal  intensity projections were obtained. CT dose reduction was achieved through use  of a standardized protocol tailored for this examination and automatic exposure  control for dose modulation. CT perfusion analysis was performed using CT with  contrast administration, including postprocessing of parametric maps with the  determination of cerebral blood flow, cerebral blood volume, and mean transit  time. CT dose reduction was achieved through use of a standardized protocol tailored  for this examination and automatic exposure control for dose modulation. This study was analyzed by the [address] ai algorithm    FINDINGS:    Delayed contrast-enhanced head CT:    Mild prominence of ventricles. There is no acute intra or extra-axial  hemorrhage. Mild to moderate bilateral subcortical and periventricular areas of  patchy low attenuation is nonspecific but likely related to changes of chronic  small vessel ischemic disease. The basal cisterns are clear. The paranasal  sinuses are clear. CTA NECK:    Great vessels: Mild aneurysmal dilatation of the ascending aorta measuring 4.2  cm. Patent origins with bovine arch anatomy.     Right subclavian artery: Mild atherosclerosis    Left subclavian artery: Mild atherosclerosis    Right common carotid artery: Mild atherosclerosis    Left common carotid artery: Mild atherosclerosis    Cervical right internal carotid artery: Patent with mild atherosclerotic changes  causing no significant stenosis by NASCET criteria. Cervical left internal carotid artery: Patent with mild atherosclerotic changes  causing no significant stenosis by NASCET criteria. Right vertebral artery: Mild ostial stenosis    Left vertebral artery: Patent    CTA HEAD:    Right cavernous internal carotid artery: Mild to moderate atherosclerosis    Left cavernous internal carotid artery: Mild to moderate atherosclerosis    Anterior cerebral arteries: Mild atherosclerosis    Anterior communicating artery: Patent    Right middle cerebral artery: Mild atherosclerosis    Left middle cerebral artery: Mild to moderate atherosclerosis with nonocclusive  calcific plaque versus atheroemboli in the M2/M3 segments (series 2, images 452,  481)    Posterior communicating arteries: Patent    Posterior cerebral arteries: Mild bilateral atherosclerosis    Basilar artery: Patent    Distal vertebral arteries: Patent    CT perfusion brain: No significant cerebral perfusion abnormality    Measurements use NASCET criteria. Prominent but nonenlarged right paratracheal mediastinal lymph node. Moderate cervical spondylosis    Impression  1. No evidence for acute, central large vessel arterial occlusion or significant  cerebral perfusion abnormality. 2. Small nonocclusive calcific plaque versus atheroemboli in the M2/M3 segments  of the left middle cerebral artery. 3. Mild to moderate atherosclerotic changes as described above. 4. Mild aneurysmal dilatation of the ascending aorta. 5. Please refer to above findings for complete details. CT CODE NEURO HEAD WO CONTRAST    Narrative  EXAM: CT CODE NEURO HEAD WO CONTRAST    INDICATION: stroke    COMPARISON: None. CONTRAST: None. TECHNIQUE: Unenhanced CT of the head was performed using 5 mm images. Brain and  bone windows were generated. Coronal and sagittal reformats.  CT dose reduction  was achieved through use of a standardized protocol tailored for this  examination and automatic exposure control for dose modulation. FINDINGS:  The ventricles and sulci are prominent but symmetric in size, shape and  configuration. There is mild periventricular white matter hypodensity. There is  no intracranial hemorrhage, extra-axial collection, or mass effect. The basilar  cisterns are open. No CT evidence of acute infarct. The bone windows demonstrate no abnormalities. The visualized portions of the  paranasal sinuses and mastoid air cells are clear. Impression  No acute intracranial process seen      MRI Results (maximum last 3): Results from [place] encounter on 01/01/23    MRI BRAIN WO CONT        CLINICAL HISTORY: stroke. INDICATION: stroke. COMPARISON: CTA 1/1/2023    TECHNIQUE: MR examination of the brain includes axial and sagittal T1, coronal  T2, axial T2, axial FLAIR, axial gradient echo, axial DWI. CONTRAST: None    FINDINGS:  There is no intracranial mass, hemorrhage or evidence of acute infarction. There is sulcal and ventricular prominence. Pontomesencephalic ratio is within  normal limits. There are scattered foci of chronic hemosiderin deposition at the  left occipital lobe and left parietal lobe but also demonstrated in the frontal  lobes. There are confluent periventricular and scattered foci of increased T2  signal intensity in the corona radiata and centrum semiovale. The brain architecture is normal. There is no evidence of midline shift or  mass-effect. There are no extra-axial fluid collections. There is no Chiari or  syrinx. The pituitary and infundibulum are grossly unremarkable. There is no  skull base mass. Cerebellopontine angles are grossly unremarkable. The major  intracranial vascular flow-voids are unremarkable. The cavernous sinuses are  symmetric. Optic chiasm and infundibulum grossly unremarkable. Orbits are  grossly symmetric. Dural venous sinuses are grossly patent.   The mastoid air cells are well pneumatized and clear. Impression  There is moderate to severe chronic microvascular ischemic change and moderate  cerebral atrophy. There is no intracranial mass, acute hemorrhage or evidence of acute infarction. No acute intracranial process is demonstrated.       Lab Review  Lab Results   Component Value Date/Time    WBC 7.0 01/01/2023 08:04 PM    HCT 33.2 (L) 01/01/2023 08:04 PM    HGB 10.8 (L) 01/01/2023 08:04 PM    PLATELET 384 22/24/6698 08:04 PM     Lab Results   Component Value Date/Time    Sodium 136 01/01/2023 08:04 PM    Potassium 4.6 01/01/2023 08:04 PM    Chloride 101 01/01/2023 08:04 PM    CO2 29 01/01/2023 08:04 PM    Glucose 86 01/01/2023 08:04 PM    BUN 30 (H) 01/01/2023 08:04 PM    Creatinine 1.24 01/01/2023 08:04 PM    Calcium 8.8 01/01/2023 08:04 PM         Signed:  JOSEF Thomas    The patient was discussed with Dr. Marty Carias.

## 2023-01-02 NOTE — PROGRESS NOTES
Consult received and appreciated. Reviewed chart and discussed case with nsg as well as patient and son bedside. MRI negative for acute infarct, Normal swallow screen [facility] and patient and son report no concerns regarding speech or swallow function. Acute SLP services not indicated at this time. Please re-consult if further needs arise. Alfredito Bhagat M.CD.  CCC-SLP

## 2023-01-02 NOTE — PROGRESS NOTES
Transition of Care Plan  RUR- Low  9%  DISPOSITION: [facility]   RN to call report to [phone]  DC Summary faxed to [facility] [phone]   F/U with PCP/Specialist    Transport: [facility]    Reason for Admission:  stroke rule out                     RUR Score:          9%           Plan for utilizing home health:      No hx of home health, per PT no DC needs    PCP: First and Last name:  Otoniel Herron MD     Name of Practice:    Are you a current patient: Yes/No: Yes   Approximate date of last visit: last week   Can you participate in a virtual visit with your PCP:                     Current Advanced Directive/Advance Care Plan: Full Code      Healthcare Decision Maker: sonDeidre  Click here to [address] including selection of the Healthcare Decision Maker Relationship (ie \"Primary\")                             Transition of Care Plan:                      CM met with patient's sonMonica at bedside to introduce self and role. Living situation: lives at [address] [address]  ADLs: independent, USP manages medications and prompts patient for meals, activities etc.   DME: cane  Previous IPR/SNF: none  Previous home health: none  Demographics: confirmed, Medicare A&B and [address]: [facility]  Main point of contact: Deidre Cervantes [phone]    Message left for DON at [facility] [phone] regarding patient's likely discharge today. CM to fax DC summary to [phone] when available. CM to follow patient progress and assist as recommended with MARGIE plan. The program assesses the family and/or caregiver's readiness, willingness, and ability to provide or support and self-management activities for the patient as needed. Care Management Interventions  PCP Verified by CM: Yes  Palliative Care Criteria Met (RRAT>21 & CHF Dx)?: No  Mode of Transport at Discharge:  Other (see comment) (son)  Transition of Care Consult (CM Consult): Discharge Planning  MyChart Signup: Yes  Discharge Durable Medical Equipment: No  Health Maintenance Reviewed: Yes  Physical Therapy Consult: Yes  Occupational Therapy Consult: Yes  Speech Therapy Consult: Yes  Support Systems: Assisted Living, Child(lópez)  Confirm Follow Up Transport: Family  The Plan for Transition of Care is Related to the Following Treatment Goals : Assisted Living  The Patient and/or Patient Representative was Provided with a Choice of Provider and Agrees with the Discharge Plan?: Yes  Name of the Patient Representative Who was Provided with a Choice of Provider and Agrees with the Discharge Plan: son  Freedom of Choice List was Provided with Basic Dialogue that Supports the Patient's Individualized Plan of Care/Goals, Treatment Preferences and Shares the Quality Data Associated with the Providers?: Yes  Discharge Location  Patient Expects to be Discharged to[de-identified] [address] [facility].

## 2023-01-02 NOTE — ROUTINE PROCESS
TRANSFER - OUT REPORT:    Verbal report given to [facility] (name) on Michaeleen November  being transferred to NSTU (unit) for routine progression of care       Report consisted of patients Situation, Background, Assessment and   Recommendations(SBAR). Information from the following report(s) SBAR, Kardex, ED Summary, Intake/Output, MAR, Cardiac Rhythm NSR, and Quality Measures was reviewed with the receiving nurse. Lines:   Peripheral IV 01/01/23 Left Antecubital (Active)   Site Assessment Clean, dry, & intact 01/01/23 2019   Phlebitis Assessment 0 01/01/23 2019   Infiltration Assessment 0 01/01/23 2019   Dressing Status Clean, dry, & intact 01/01/23 2019   Dressing Type Transparent 01/01/23 2019   Hub Color/Line Status Green 01/01/23 2019        Opportunity for questions and clarification was provided.       Patient transported with:   [employer]

## 2023-01-02 NOTE — DISCHARGE SUMMARY
Physician Discharge Summary     Patient ID:    Chuyita Richardson  [id number]  1935    Admit date: 1/1/2023    Discharge date and time: 1/2/2023    Hospital Diagnoses and Treatment Rendered:   Chuyita Richardson is a 82 y.o. male with a pmhx of alzheimers dementia, and HTN who presents with aphasia that started on 6pm.  He was eating dinner at the time. His sx rapidly resolved. Patient's son who gives the majority of the history due to patient's dementia, patient has recurrently stated that he can not speak or walk. Per smita's son, this has happened again while patient was at his home. Patient was speaking, and had just ambulated to the bathroom without assistance     In the ED, he was tachycardic to 112, and had RR of 35. Initial BP was 181/90. Labs showed BUN 30, creatinine 1.24, and macrocytic anemia with hgb 10.8. CT head with no acute intracranial process. CTA head/neck with no acute large vessel occlusion or perfusion abnormality. There was small nonocclusive plaque/atheroembolus in M2/M3 of left MCA, and mild aneurysmal dilatations of ascending aorta. ? aphasia  Weakness  C/F CVA  - Neurology consulted, will follow up with their recommendations  - CT code stroke: No acute large vessel occlusion, no acute intracranial process  - MRI brain: Diffuse periventricular white matter disease is compatible with chronic small  vessel ischemia.  No evidence of acute infarct, intracranial mass, or acute  intracranial hemorrhage  - Lipid panel: WNL, will continue statin pending neuro recs   - Continue Aspirin and statin at discharge, per Son pt is already taking these at [facility]  - PT/OT to evaluate     Alzhemiers dementia  - Lives at [facility]  - Continue home medications at discharge    Hypertension  - Continue home medications at discharge  - Follow up with PCP, per neurology goal BP is < 140/90      Chronic Diagnoses:    Problem List as of 1/2/2023 Never Reviewed            Codes Class Noted - Resolved    TIA (transient ischemic attack) ICD-10-CM: G45.9  ICD-9-CM: 435.9  1/1/2023 - Present           Discharge Medications: There are no discharge medications for this patient. Follow up Care:    1. Debbie Ching MD in 1-2 weeks. Please call to set up an appointment shortly after discharge. Diet:  Regular Diet    Disposition:  Assisted Living facility. Advanced Directive:   FULL x   DNR      Discharge Exam:  See today's note. CONSULTATIONS: Neurology    Significant Diagnostic Studies:   1/1/2023: BUN 30 MG/DL (H; Ref range: 6 - 20 MG/DL); Calcium 8.8 MG/DL (Ref range: 8.5 - 10.1 MG/DL); CO2 29 mmol/L (Ref range: 21 - 32 mmol/L); Creatinine 1.24 MG/DL (Ref range: 0.70 - 1.30 MG/DL); Glucose 86 mg/dL (Ref range: 65 - 100 mg/dL); HCT 33.2 % (L; Ref range: 36.6 - 50.3 %); HGB 10.8 g/dL (L; Ref range: 12.1 - 17.0 g/dL); Potassium 4.6 mmol/L (Ref range: 3.5 - 5.1 mmol/L); Sodium 136 mmol/L (Ref range: 136 - 145 mmol/L)  Recent Labs     01/01/23 2004   WBC 7.0   HGB 10.8*   HCT 33.2*        Recent Labs     01/01/23 2004      K 4.6      CO2 29   BUN 30*   CREA 1.24   GLU 86   CA 8.8     Recent Labs     01/01/23 2004   AP 43*   TP 6.5   ALB 3.8   GLOB 2.7     Recent Labs     01/01/23 2004   INR 1.0   PTP 10.6      No results for input(s): FE, TIBC, PSAT, FERR in the last 72 hours. No results for input(s): PH, PCO2, PO2 in the last 72 hours. No results for input(s): CPK, CKMB in the last 72 hours.     No lab exists for component: TROPONINI  No components found for: [address] [place] [address]          Signed:  Paradise Nuñez PA-C  1/2/2023  2:35 PM

## 2023-01-02 NOTE — ED PROVIDER NOTES
79-year-old male with history of Alzheimer's dementia, HTN presents to the ED with chief complaint of aphasia occurring at approximately 6 PM.  Per son, patient was at the dinner table when he said that he felt like he could not talk and could not move. Son said the patient was then able to go to the bathroom and back unassisted. Per son these sorts of complaints have been a common occurrence for the patient since he began developing Alzheimer's. Patient currently denies any symptoms. No numbness, weakness, vision changes. Patient acting normally per son. No recent fevers, chills, cough, chest pain, difficulty breathing, abdominal pain, urinary symptoms, bowel symptoms. The history is provided by the patient and a relative. Aphasia  Primary symptoms include speech difficulty. Pertinent negatives include no shortness of breath, no chest pain, no vomiting, no confusion, no headaches and no nausea. No past medical history on file. No past surgical history on file. No family history on file. Social History     Socioeconomic History    Marital status: Not on file     Spouse name: Not on file    Number of children: Not on file    Years of education: Not on file    Highest education level: Not on file   Occupational History    Not on file   Tobacco Use    Smoking status: Not on file    Smokeless tobacco: Not on file   Substance and Sexual Activity    Alcohol use: Not on file    Drug use: Not on file    Sexual activity: Not on file   Other Topics Concern    Not on file   Social History Narrative    Not on file     Social Determinants of Health     Financial Resource Strain: Not on file   Food Insecurity: Not on file   Transportation Needs: Not on file   Physical Activity: Not on file   Stress: Not on file   Social Connections: Not on file   Intimate Partner Violence: Not on file   Housing Stability: Not on file         ALLERGIES: Patient has no allergy information on record.     Review of Systems Constitutional:  Negative for chills and fever. HENT:  Negative for congestion and rhinorrhea. Respiratory:  Negative for cough, shortness of breath and wheezing. Cardiovascular:  Negative for chest pain and leg swelling. Gastrointestinal:  Negative for abdominal pain, constipation, diarrhea, nausea and vomiting. Genitourinary:  Negative for difficulty urinating, dysuria and hematuria. Musculoskeletal:  Negative for back pain and neck pain. Skin:  Negative for color change and rash. Neurological:  Positive for speech difficulty and weakness. Negative for numbness and headaches. Psychiatric/Behavioral:  Negative for behavioral problems and confusion. Vitals:    01/01/23 1943   BP: (!) 181/90   Pulse: 96   Resp: 16   Temp: 98.4 °F (36.9 °C)   SpO2: 98%   Weight: 93.7 kg (206 lb 9.1 oz)            Physical Exam  Constitutional:       General: He is not in acute distress. Appearance: He is well-developed. HENT:      Head: Normocephalic and atraumatic. Eyes:      Conjunctiva/sclera: Conjunctivae normal.      Pupils: Pupils are equal, round, and reactive to light. Neck:      Trachea: No tracheal deviation. Cardiovascular:      Rate and Rhythm: Normal rate and regular rhythm. Heart sounds: No murmur heard. No friction rub. No gallop. Pulmonary:      Effort: No respiratory distress. Breath sounds: Normal breath sounds. Abdominal:      General: Bowel sounds are normal. There is no distension. Palpations: Abdomen is soft. Tenderness: There is no abdominal tenderness. Musculoskeletal:         General: No deformity. Cervical back: Neck supple. Skin:     General: Skin is warm and dry. Neurological:      Mental Status: He is alert. Cranial Nerves: No cranial nerve deficit. Sensory: No sensory deficit. Motor: No weakness.       Comments: Oriented to person only, at baseline        MDM  Number of Diagnoses or Management Options  Aphasia  Diagnosis management comments: Differential diagnosis: ischemic stroke, ICH, Alzheimer's dementia, electrolyte abnormality    ED physician interpretation of imaging: no acute bleeding on non-contrast CT  ED physician interpretation of laboratory results: No acute findings    MDM: 81 yo male presenting w/ possible transient aphasia at home. At baseline in ED. Concern for TIA, possible embolic disease on CTA, neuro recommending admission for TIA workup. Otherwise stable. Amount and/or Complexity of Data Reviewed  Clinical lab tests: ordered and reviewed  Tests in the radiology section of CPT®: ordered and reviewed          Procedures    CONSULT NOTE:   9:30 PM  Spoke with teleneurology  Discussed pt's hx, disposition, and available diagnostic and imaging results. Reviewed care plans. Consultant agrees with plans as outlined. Concerned that patient may be having embolic TIAs related to aortic or carotid arch calcifications. .  Recommending MRI and admission to the hospital for further work-up. 9:31 PM    Admission Note:  Patient is being admitted to the hospital by Dr. Raleigh Peres, Service: Hospitalist.  The results of their tests and reasons for their admission have been discussed with them and available family. They convey agreement and understanding for the need to be admitted and for their admission diagnosis. LABORATORY TESTS:  Labs Reviewed   METABOLIC PANEL, COMPREHENSIVE - Abnormal; Notable for the following components:       Result Value    BUN 30 (*)     BUN/Creatinine ratio 24 (*)     eGFR 57 (*)     Alk. phosphatase 43 (*)     All other components within normal limits   CBC WITH AUTOMATED DIFF - Abnormal; Notable for the following components:    RBC 3.33 (*)     HGB 10.8 (*)     HCT 33.2 (*)     MCV 99.7 (*)     MPV 8.8 (*)     MONOCYTES 14 (*)     EOSINOPHILS 8 (*)     BASOPHILS 2 (*)     ABS.  EOSINOPHILS 0.5 (*)     All other components within normal limits   PROTHROMBIN TIME + INR   SAMPLES BEING HELD   GLUCOSE, POC       IMAGING RESULTS:  CTA CODE NEURO HEAD AND NECK W CONT    Result Date: 1/1/2023  CLINICAL HISTORY: Code stroke EXAMINATION:  CT ANGIOGRAPHY HEAD AND NECK COMPARISON: None TECHNIQUE:  Following the uneventful administration of iodinated contrast material, axial CT angiography of the head and neck was performed. Delayed axial images through the head were also obtained. Coronal and sagittal reconstructions were obtained. Manual postprocessing of images was performed. 3-D  Sagittal maximal intensity projection images were obtained. 3-D Coronal maximal intensity projections were obtained. CT dose reduction was achieved through use of a standardized protocol tailored for this examination and automatic exposure control for dose modulation. CT perfusion analysis was performed using CT with contrast administration, including postprocessing of parametric maps with the determination of cerebral blood flow, cerebral blood volume, and mean transit time. CT dose reduction was achieved through use of a standardized protocol tailored for this examination and automatic exposure control for dose modulation. This study was analyzed by the [address] ai algorithm FINDINGS: Delayed contrast-enhanced head CT: Mild prominence of ventricles. There is no acute intra or extra-axial hemorrhage. Mild to moderate bilateral subcortical and periventricular areas of patchy low attenuation is nonspecific but likely related to changes of chronic small vessel ischemic disease. The basal cisterns are clear. The paranasal sinuses are clear. CTA NECK: Great vessels: Mild aneurysmal dilatation of the ascending aorta measuring 4.2 cm. Patent origins with bovine arch anatomy.  Right subclavian artery: Mild atherosclerosis Left subclavian artery: Mild atherosclerosis Right common carotid artery: Mild atherosclerosis Left common carotid artery: Mild atherosclerosis Cervical right internal carotid artery: Patent with mild atherosclerotic changes causing no significant stenosis by NASCET criteria. Cervical left internal carotid artery: Patent with mild atherosclerotic changes causing no significant stenosis by NASCET criteria. Right vertebral artery: Mild ostial stenosis Left vertebral artery: Patent CTA HEAD: Right cavernous internal carotid artery: Mild to moderate atherosclerosis Left cavernous internal carotid artery: Mild to moderate atherosclerosis Anterior cerebral arteries: Mild atherosclerosis Anterior communicating artery: Patent Right middle cerebral artery: Mild atherosclerosis Left middle cerebral artery: Mild to moderate atherosclerosis with nonocclusive calcific plaque versus atheroemboli in the M2/M3 segments (series 2, images 452, 481) Posterior communicating arteries: Patent Posterior cerebral arteries: Mild bilateral atherosclerosis Basilar artery: Patent Distal vertebral arteries: Patent CT perfusion brain: No significant cerebral perfusion abnormality Measurements use NASCET criteria. Prominent but nonenlarged right paratracheal mediastinal lymph node. Moderate cervical spondylosis     1. No evidence for acute, central large vessel arterial occlusion or significant cerebral perfusion abnormality. 2. Small nonocclusive calcific plaque versus atheroemboli in the M2/M3 segments of the left middle cerebral artery. 3. Mild to moderate atherosclerotic changes as described above. 4. Mild aneurysmal dilatation of the ascending aorta. 5. Please refer to above findings for complete details. CT CODE NEURO HEAD WO CONTRAST    Result Date: 1/1/2023  EXAM: CT CODE NEURO HEAD WO CONTRAST INDICATION: stroke COMPARISON: None. CONTRAST: None. TECHNIQUE: Unenhanced CT of the head was performed using 5 mm images. Brain and bone windows were generated. Coronal and sagittal reformats.  CT dose reduction was achieved through use of a standardized protocol tailored for this examination and automatic exposure control for dose modulation. FINDINGS: The ventricles and sulci are prominent but symmetric in size, shape and configuration. There is mild periventricular white matter hypodensity. There is no intracranial hemorrhage, extra-axial collection, or mass effect. The basilar cisterns are open. No CT evidence of acute infarct. The bone windows demonstrate no abnormalities. The visualized portions of the paranasal sinuses and mastoid air cells are clear. No acute intracranial process seen    CT CODE NEURO PERF W CBF    Result Date: 1/1/2023  CLINICAL HISTORY: Code stroke EXAMINATION:  CT ANGIOGRAPHY HEAD AND NECK COMPARISON: None TECHNIQUE:  Following the uneventful administration of iodinated contrast material, axial CT angiography of the head and neck was performed. Delayed axial images through the head were also obtained. Coronal and sagittal reconstructions were obtained. Manual postprocessing of images was performed. 3-D  Sagittal maximal intensity projection images were obtained. 3-D Coronal maximal intensity projections were obtained. CT dose reduction was achieved through use of a standardized protocol tailored for this examination and automatic exposure control for dose modulation. CT perfusion analysis was performed using CT with contrast administration, including postprocessing of parametric maps with the determination of cerebral blood flow, cerebral blood volume, and mean transit time. CT dose reduction was achieved through use of a standardized protocol tailored for this examination and automatic exposure control for dose modulation. This study was analyzed by the [address] ai algorithm FINDINGS: Delayed contrast-enhanced head CT: Mild prominence of ventricles. There is no acute intra or extra-axial hemorrhage. Mild to moderate bilateral subcortical and periventricular areas of patchy low attenuation is nonspecific but likely related to changes of chronic small vessel ischemic disease. The basal cisterns are clear. The paranasal sinuses are clear. CTA NECK: Great vessels: Mild aneurysmal dilatation of the ascending aorta measuring 4.2 cm. Patent origins with bovine arch anatomy. Right subclavian artery: Mild atherosclerosis Left subclavian artery: Mild atherosclerosis Right common carotid artery: Mild atherosclerosis Left common carotid artery: Mild atherosclerosis Cervical right internal carotid artery: Patent with mild atherosclerotic changes causing no significant stenosis by NASCET criteria. Cervical left internal carotid artery: Patent with mild atherosclerotic changes causing no significant stenosis by NASCET criteria. Right vertebral artery: Mild ostial stenosis Left vertebral artery: Patent CTA HEAD: Right cavernous internal carotid artery: Mild to moderate atherosclerosis Left cavernous internal carotid artery: Mild to moderate atherosclerosis Anterior cerebral arteries: Mild atherosclerosis Anterior communicating artery: Patent Right middle cerebral artery: Mild atherosclerosis Left middle cerebral artery: Mild to moderate atherosclerosis with nonocclusive calcific plaque versus atheroemboli in the M2/M3 segments (series 2, images 452, 481) Posterior communicating arteries: Patent Posterior cerebral arteries: Mild bilateral atherosclerosis Basilar artery: Patent Distal vertebral arteries: Patent CT perfusion brain: No significant cerebral perfusion abnormality Measurements use NASCET criteria. Prominent but nonenlarged right paratracheal mediastinal lymph node. Moderate cervical spondylosis     1. No evidence for acute, central large vessel arterial occlusion or significant cerebral perfusion abnormality. 2. Small nonocclusive calcific plaque versus atheroemboli in the M2/M3 segments of the left middle cerebral artery. 3. Mild to moderate atherosclerotic changes as described above. 4. Mild aneurysmal dilatation of the ascending aorta. 5. Please refer to above findings for complete details.        MEDICATIONS GIVEN:  Medications   iopamidoL (ISOVUE-370) 370 mg iodine /mL (76 %) injection 100 mL (80 mL IntraVENous Given 1/1/23 1958)   iopamidoL (ISOVUE-370) 370 mg iodine /mL (76 %) injection 50 mL (40 mL IntraVENous Given 1/1/23 1958)       IMPRESSION:  1. Aphasia        PLAN:  - Admit to hospitalist    CONDITION:  stable    Hospitalist Perfect Serve for Admission  9:31 PM    ED Room Number: ER12/12  Patient Name and age:  Edilson Gonzalez 82 y.o.  male  Working Diagnosis:   1. Aphasia        COVID-19 Suspicion:  no    Code Status:  Full Code  Readmission: no  Isolation Requirements:  no  Recommended Level of Care:  med/surg  Department:[facility] Adult ED - 21   Other: History of Alzheimer's, complaint of transient aphasia and weakness at home, resolved on arrival to ED. CTA concerning for possible embolic disease causing recurrent TIAs.   Neurology recommending admission for MRI, stroke work-up, and medical optimization    Signed By: Alfredo Regan MD     January 1, 2023

## 2023-01-02 NOTE — PROGRESS NOTES
Neurocritical Care Code Stroke Documentation      Symptoms: Word finding difficulties   Baseline mRS:      Last Known Well: 6pm   Medical hx: No past medical history on file. Anticoagulation: Baby aspirin. VAN:  Negative   NIHSS:   1a-LOC:0    1b-Month/Age:0    1c-Open/Close Hand:0    2-Best Gaze:0    3-Visual Fields:0    4-Facial Palsy:0    5a-Left Arm:0    5b-Right Arm:0    6a-Left Le    6b-Right Le    7-Limb Ataxia:    8-Sensory:0    9-Best Language:1    10-Dysarthria:0    11-Extinction/Inattention:0  TOTAL SCORE:1   Imaging:   CT- No acute intracranial process    CTA- No LVO, small nonocclusive clacificed plaque versus atheroemboli in the left M2/M3 segments of MCA. (NIS reviewed and stated most likely calcified emboli coming from aortic arch or carotid bifurcation). CTP- No significant perfusion defect. Plan:   TNK Candidate: NO    Mechanical thrombectomy Candidate: NO  TIA workup, MRI, ASA level. If aspirin is therapeutic, will need DAPT. If not therapeutic can increase aspirin to full dose. *Perform dysphagia screening prior to any PO intake*    Discussed with: Dr. Rachna Osei, ER attending, Dr. Miguel A Hill spoke with ER physician. Arrival time: 0748  Time spent: 60 minutes.      Nasreen Bernabe NP  Neurocritical Care Nurse Practitioner  [phone]

## 2023-01-02 NOTE — H&P
[address] [facility] Adult  Hospitalist Group  History and Physical    Date of Service:  1/1/2023  Primary Care Provider: Brian Thorne MD  Source of information: Chart review and Spouse/family member    Chief Complaint: Aphasia      History of Presenting Illness:   Blanco Pablo is a 82 y.o. male with a pmhx of alzheimers dementia, and HTN who presents with aphasia that started on 6pm.  He was eating dinner at the time. His sx rapidly resolved. Patient's son who gives the majority of the history due to patient's dementia, patient has recurrently stated that he can not speak or walk. Per smita's son, this has happened again while patient was at his home. Patient was speaking, and had just ambulatedto   In the ED, he was tachycardic to 112, and had RR of 35. Initial BP was 181/90. Labs showed BUN 30, creatinine 1.24, and macrocytic anemia with hgb 10.8. CT head with no acute intracranial process. CTA head/neck with no acute large vessel occlusion or perfusion abnormality. There was small nonocclusive plaque/atheroembolus in M2/M3 of left MCA, and mild aneurysmal dilatations of ascending aorta. REVIEW OF SYSTEMS:  A comprehensive review of systems was negative except for that written in the History of Present Illness. No past medical history on file. No past surgical history on file. Prior to Admission medications    Not on File     Not on File   No family history on file.    Social History:     Social Determinants of Health     Tobacco Use: Not on file   Alcohol Use: Not on file   Financial Resource Strain: Not on file   Food Insecurity: Not on file   Transportation Needs: Not on file   Physical Activity: Not on file   Stress: Not on file   Social Connections: Not on file   Intimate Partner Violence: Not on file   Depression: Not on file   Housing Stability: Not on file        Family and social history were personally reviewed, all pertinent and relevant details are outlined as above.    Objective:   Visit Vitals  BP (!) 156/116   Pulse (!) 112   Temp 98.4 °F (36.9 °C)   Resp (!) 35   Wt 93.7 kg (206 lb 9.1 oz)   SpO2 96%      O2 Device: None (Room air)    PHYSICAL EXAM:   General: Alert x oriented x 3, awake, no acute distress, resting in bed, pleasant male, appears to be stated age  HEENT: PEERL, EOMI, moist mucus membranes  Neck: Supple, no JVD, no meningeal signs  Chest: Clear to auscultation bilaterally   CVS: RRR, S1 S2 heard, no murmurs/rubs/gallops  Abd: Soft, non-tender, non-distended, +bowel sounds   Ext: No clubbing, no cyanosis, no edema  Neuro/Psych: Pleasant mood and affect, CN 2-12 grossly intact, sensory grossly within normal limit, Strength 5/5 in all extremities  Cap refill: Brisk, less than 3 seconds  Pulses: 2+radial pulses  Skin: Warm, dry, without rashes or lesions    Data Review: All diagnostic labs and studies have been reviewed. Abnormal Labs Reviewed   METABOLIC PANEL, COMPREHENSIVE - Abnormal; Notable for the following components:       Result Value    BUN 30 (*)     BUN/Creatinine ratio 24 (*)     eGFR 57 (*)     Alk. phosphatase 43 (*)     All other components within normal limits   CBC WITH AUTOMATED DIFF - Abnormal; Notable for the following components:    RBC 3.33 (*)     HGB 10.8 (*)     HCT 33.2 (*)     MCV 99.7 (*)     MPV 8.8 (*)     MONOCYTES 14 (*)     EOSINOPHILS 8 (*)     BASOPHILS 2 (*)     ABS. EOSINOPHILS 0.5 (*)     All other components within normal limits       All Micro Results       None            IMAGING:   CTA CODE NEURO HEAD AND NECK W CONT   Final Result      1. No evidence for acute, central large vessel arterial occlusion or significant   cerebral perfusion abnormality. 2. Small nonocclusive calcific plaque versus atheroemboli in the M2/M3 segments   of the left middle cerebral artery. 3. Mild to moderate atherosclerotic changes as described above. 4. Mild aneurysmal dilatation of the ascending aorta.    5. Please refer to above findings for complete details. CT CODE NEURO PERF W CBF   Final Result      1. No evidence for acute, central large vessel arterial occlusion or significant   cerebral perfusion abnormality. 2. Small nonocclusive calcific plaque versus atheroemboli in the M2/M3 segments   of the left middle cerebral artery. 3. Mild to moderate atherosclerotic changes as described above. 4. Mild aneurysmal dilatation of the ascending aorta. 5. Please refer to above findings for complete details. CT CODE NEURO HEAD WO CONTRAST   Final Result   No acute intracranial process seen      MRI BRAIN WO CONT    (Results Pending)        ECG/ECHO:  No results found for this or any previous visit. Assessment:   Given the patient's current clinical presentation, there is a high level of concern for decompensation if discharged from the emergency department. Complex decision making was performed, which includes reviewing the patient's available past medical records, laboratory results, and imaging studies. Active Problems:    TIA (transient ischemic attack) (1/1/2023)        Plan: #?aphasia  #weakness  -CT imaging negative for acute findings  -PTOT/speech  -MRI and echo pending  -check hgba1c, and lipid panel\  -continue ASA and statin  -neuro consult    #Alzhemiers dementia  -case management  -lives at [facility]    DIET: ADULT DIET Regular; 5 carb choices (75 gm/meal); Low Fat/Low Chol/High Fiber/2 gm Na; Low Sodium (2 gm)   ISOLATION PRECAUTIONS: There are currently no Active Isolations  CODE STATUS: Full Code   DVT PROPHYLAXIS: SCDs  FUNCTIONAL STATUS PRIOR TO HOSPITALIZATION: Fully active and ambulatory; able to carry on all self-care without restriction. Ambulatory status/function: By self   EARLY MOBILITY ASSESSMENT: Recommend routine ambulation while hospitalized with the assistance of nursing staff  ANTICIPATED DISCHARGE: 24-48 hours.   ANTICIPATED DISPOSITION: LTC  EMERGENCY CONTACT/SURROGATE DECISION MAKER: son      Signed By: Luz Miramontes MD     January 1, 2023         Please note that this dictation may have been completed with Dragon, the computer voice recognition software. Quite often unanticipated grammatical, syntax, homophones, and other interpretive errors are inadvertently transcribed by the computer software. Please disregard these errors. Please excuse any errors that have escaped final proofreading.

## 2023-01-02 NOTE — PROGRESS NOTES
Problem: Falls - Risk of  Goal: *Absence of Falls  Description: Document Rama Buenrostro Fall Risk and appropriate interventions in the flowsheet.   Outcome: Progressing Towards Goal  Note: Fall Risk Interventions:  Mobility Interventions: Assess mobility with egress test, Bed/chair exit alarm, Communicate number of staff needed for ambulation/transfer, OT consult for ADLs, Patient to call before getting OOB, PT Consult for mobility concerns, PT Consult for assist device competence, Utilize walker, cane, or other assistive device    Mentation Interventions: Adequate sleep, hydration, pain control, Bed/chair exit alarm, Door open when patient unattended, Evaluate medications/consider consulting pharmacy, Increase mobility, More frequent rounding, Reorient patient, Room close to nurse's station, Toileting rounds, Update white board    Medication Interventions: Assess postural VS orthostatic hypotension, Bed/chair exit alarm, Evaluate medications/consider consulting pharmacy, Patient to call before getting OOB, Teach patient to arise slowly                   Problem: TIA/CVA Stroke: 0-24 hours  Goal: Off Pathway (Use only if patient is Off Pathway)  Outcome: Progressing Towards Goal  Goal: Activity/Safety  Outcome: Progressing Towards Goal  Goal: Consults, if ordered  Outcome: Progressing Towards Goal  Goal: Diagnostic Test/Procedures  Outcome: Progressing Towards Goal  Goal: Nutrition/Diet  Outcome: Progressing Towards Goal  Goal: Discharge Planning  Outcome: Progressing Towards Goal  Goal: Medications  Outcome: Progressing Towards Goal  Goal: Respiratory  Outcome: Progressing Towards Goal  Goal: Treatments/Interventions/Procedures  Outcome: Progressing Towards Goal  Goal: Minimize risk of bleeding post-thrombolytic infusion  Outcome: Progressing Towards Goal  Goal: Monitor for complications post-thrombolytic infusion  Outcome: Progressing Towards Goal  Goal: Psychosocial  Outcome: Progressing Towards Goal  Goal: *Hemodynamically stable  Outcome: Progressing Towards Goal  Goal: *Neurologically stable  Description: Absence of additional neurological deficits    Outcome: Progressing Towards Goal  Goal: *Verbalizes anxiety and depression are reduced or absent  Outcome: Progressing Towards Goal  Goal: *Absence of Signs of Aspiration on Current Diet  Outcome: Progressing Towards Goal  Goal: *Absence of deep venous thrombosis signs and symptoms(Stroke Metric)  Outcome: Progressing Towards Goal  Goal: *Ability to perform ADLs and demonstrates progressive mobility and function  Outcome: Progressing Towards Goal  Goal: *Stroke education started(Stroke Metric)  Outcome: Progressing Towards Goal  Goal: *Dysphagia screen performed(Stroke Metric)  Outcome: Progressing Towards Goal  Goal: *Rehab consulted(Stroke Metric)  Outcome: Progressing Towards Goal     Problem: Pressure Injury - Risk of  Goal: *Prevention of pressure injury  Description: Document Imtiaz Scale and appropriate interventions in the flowsheet.   Outcome: Progressing Towards Goal  Note: Pressure Injury Interventions:

## 2023-01-02 NOTE — ED TRIAGE NOTES
Triage: Pt arrives ambulatory with his cane from home with CC of having sudden word finding difficulty and difficulty with movement at dinner. He reports this happened at approximately 1830. Prior to 1830 he felt fine. Pt's son reports pt was moving and talking at his baseline throughout the episode, however, patient felt concerned and wanted to be evaluated for stroke.

## 2023-01-02 NOTE — PROGRESS NOTES
PHYSICAL THERAPY EVALUATION/DISCHARGE  Patient: Luis Carlos Cardoso (81 y.o. male)  Date: 1/2/2023  Primary Diagnosis: TIA (transient ischemic attack) [G45.9]       Precautions: fall         ASSESSMENT  Based on the objective data described below, the patient presents with baseline mobility demonstrating good strength, ROM and general mobility. Son present to assist with PMH which includes 2 major falls in the last few months but patient does  not remember. Patient normally walks with single point cane (not present) and is independent in his assisted living facility. Very supportive son that visits several times a day and patient spends most of Saturday and Sunday at sons home. Did not present with any significant deficits other than a few word finding issues which son states present since first diagnosed with Alzheimers. Feel he is safe to return to [facility]. No follow up needed. .    Functional Outcome Measure: The patient scored 52/56 on the Guevara Balance outcome measure which is indicative of low fall risk. Other factors to consider for discharge:      Further skilled acute physical therapy is not indicated at this time. PLAN :  Recommendation for discharge: (in order for the patient to meet his/her long term goals)  No skilled physical therapy/ follow up rehabilitation needs identified at this time. This discharge recommendation:  Has not yet been discussed the attending provider and/or case management    IF patient discharges home will need the following DME: patient owns DME required for discharge       SUBJECTIVE:   Patient stated I don't like these shoes. They are too soft.     OBJECTIVE DATA SUMMARY:   HISTORY:    No past medical history on file. No past surgical history on file.     Prior level of function: modified independent  Personal factors and/or comorbidities impacting plan of care:     Home Situation  Home Environment: [address] Name: Ol[place]  One/Two Story Residence: Two story (can take elevator)  Living Alone: No  Support Systems: Assisted Living, Child(lópez)  Patient Expects to be Discharged to[de-identified] Home  Current DME Used/Available at Home: Cane, straight    EXAMINATION/PRESENTATION/DECISION MAKING:   Critical Behavior:  Neurologic State: Confused, Eyes open spontaneously  Orientation Level: Oriented to person, Oriented to place, Disoriented to situation, Disoriented to time  Cognition: Follows commands, Memory loss     Range Of Motion:  AROM: Within functional limits                       Strength:    Strength: Within functional limits                    Tone & Sensation:   Tone: Normal              Sensation: Intact               Coordination:  Coordination: Within functional limits  Vision: glasses     Functional Mobility:  Bed Mobility:  Rolling: Independent  Supine to Sit: Independent  Sit to Supine: Independent  Scooting: Independent  Transfers:  Sit to Stand: Modified independent  Stand to Sit: Modified independent  Stand Pivot Transfers: Modified independent                    Balance:   Sitting: Intact  Standing: Impaired; Without support  Standing - Static: Good  Standing - Dynamic : Good  Ambulation/Gait Training:  Distance (ft): 200 Feet (ft)  Assistive Device: Gait belt  Ambulation - Level of Assistance: Modified independent     Gait Description (WDL): Exceptions to WDL                 Speed/Eva: Slow  Step Length: Left shortened;Right shortened                     Stairs:  Number of Stairs Trained: 12  Stairs - Level of Assistance: Supervision   Rail Use: Right       Functional Measure:  Guevara Balance Test:    Sitting to Standin  Standing Unsupported: 4  Sitting with Back Unsupported: 4  Standing to Sittin  Transfers: 4  Standing Unsupported with Eyes Closed: 4  Standing Unsupported with Feet Together: 4  Reach Forward with Outstretched Arm: 4   Object: 4  Turn to Look Over Shoulders: 4  Turn 360 Degrees: 4  Alternate Foot on Step/Stool: 4  Standing Unsupported One Foot in Front: 2  Stand on One Le  Total: 52/56         56=Maximum possible score;   0-20=High fall risk  21-40=Moderate fall risk   41-56=Low fall risk               Physical Therapy Evaluation Charge Determination   History Examination Presentation Decision-Making   LOW Complexity : Zero comorbidities / personal factors that will impact the outcome / POC LOW Complexity : 1-2 Standardized tests and measures addressing body structure, function, activity limitation and / or participation in recreation  LOW Complexity : Stable, uncomplicated        Based on the above components, the patient evaluation is determined to be of the following complexity level: LOW     Pain Ratin    Activity Tolerance:   Good and elevated BP      After treatment patient left in no apparent distress:   Bed / chair alarm activated, Caregiver / family present, and sitting EOB eating breakfast    COMMUNICATION/EDUCATION:   The patients plan of care was discussed with: Registered nurse.          Thank you for this referral.  Leonides García, PT   Time Calculation: 26 mins

## 2023-01-02 NOTE — DISCHARGE INSTRUCTIONS
Discharge SNF/Rehab Instructions/LTAC       PATIENT ID: Luis Carlos Cardoso  MRN: [id number]   YOB: 1935    DATE OF ADMISSION: 1/1/2023  7:48 PM    DATE OF DISCHARGE: 1/2/2023    PRIMARY CARE PROVIDER: Ann Olmedo MD       ATTENDING PHYSICIAN: Ashok Romero PA-C  DISCHARGING PROVIDER: Fidencio Martinez PA-C     To contact this individual call [phone] and ask the  to page. If unavailable ask to be transferred the Adult Hospitalist Department. CONSULTATIONS: IP CONSULT TO NEUROLOGY    PROCEDURES/SURGERIES: * No surgery found *    ADMITTING DIAGNOSES & HOSPITAL COURSE:   Luis Carlos Cardoso is a 82 y.o. male with a pmhx of alzheimers dementia, and HTN who presents with aphasia that started on 6pm.  He was eating dinner at the time. His sx rapidly resolved. Patient's son who gives the majority of the history due to patient's dementia, patient has recurrently stated that he can not speak or walk. Per smita's son, this has happened again while patient was at his home. Patient was speaking, and had just ambulated to the bathroom without assistance     In the ED, he was tachycardic to 112, and had RR of 35. Initial BP was 181/90. Labs showed BUN 30, creatinine 1.24, and macrocytic anemia with hgb 10.8. CT head with no acute intracranial process. CTA head/neck with no acute large vessel occlusion or perfusion abnormality. There was small nonocclusive plaque/atheroembolus in M2/M3 of left MCA, and mild aneurysmal dilatations of ascending aorta        DISCHARGE DIAGNOSES / PLAN:      ?aphasia  Weakness  C/F CVA  - Neurology consulted, will follow up with their recommendations  - CT code stroke: No acute large vessel occlusion, no acute intracranial process  - MRI brain: Diffuse periventricular white matter disease is compatible with chronic small  vessel ischemia.  No evidence of acute infarct, intracranial mass, or acute  intracranial hemorrhage  - Lipid panel: WNL, will continue statin pending neuro recs   - Continue Aspirin and statin at discharge  - PT/OT to evaluate     Alzhemiers dementia  - [facility]  - Continue home medications at discharge     Hypertension  - Continue home medications at discharge  - Follow up with PCP, per neurology goal BP is < 140/90       PENDING TEST RESULTS:   At the time of discharge the following test results are still pending: None    FOLLOW UP APPOINTMENTS:    Follow-up Information       Follow up With Specialties Details Why Contact Info    Adalgisa Negron MD Internal Medicine Physician Follow up in 1 month(s) Follow up for Blood pressure medications, statin and aspirin [address]  [place] [address] [place] [phone]               ADDITIONAL CARE RECOMMENDATIONS:     DIET: Regular Diet    ACTIVITY: Activity as tolerated    DISCHARGE MEDICATIONS:   See Medication Reconciliation Form      NOTIFY YOUR PHYSICIAN FOR ANY OF THE FOLLOWING:   Fever over 101 degrees for 24 hours. Chest pain, shortness of breath, fever, chills, nausea, vomiting, diarrhea, change in mentation, falling, weakness, bleeding. Severe pain or pain not relieved by medications. Or, any other signs or symptoms that you may have questions about.     DISPOSITION:    Home With:   OT  PT  HH  RN      x SNF/Inpatient Rehab/LTAC    Independent/assisted living    Hospice    Other:       PATIENT CONDITION AT DISCHARGE:     Functional status    Poor     Deconditioned    x Independent      Cognition     Lucid     Forgetful    x Dementia      Catheters/lines (plus indication)    [place]     PICC     PEG    x None      Code status   x  Full code     DNR      PHYSICAL EXAMINATION AT DISCHARGE:   Refer to Progress Note 1/2/2023      CHRONIC MEDICAL DIAGNOSES:  Problem List as of 1/2/2023 Never Reviewed            Codes Class Noted - Resolved    TIA (transient ischemic attack) ICD-10-CM: G45.9  ICD-9-CM: 435.9  1/1/2023 - Present             CDMP Checked:   Yes x     PROBLEM LIST Updated:  Yes x         Signed:   Brown Fuller PA-C  1/2/2023  2:40 PM